# Patient Record
(demographics unavailable — no encounter records)

---

## 2024-10-10 NOTE — HISTORY OF PRESENT ILLNESS
[TextBox_4] : 9/6/24  60F Morbid Obesity Severe DARIAN diagnosed in 2015 Set up with APPA 5-15 on 6/6/2015 Compliant with and benefiting from nocturnal CPAP CPAP now registering that machine has exceeded its motor life.  HTN, DM, RA Never a smoker] TAHJBSO, Nasal Septal repair, Tonsil and adenoid  Lately using her autistic son's Apap as he cannot.

## 2024-10-10 NOTE — DISCUSSION/SUMMARY
[FreeTextEntry1] : IMP  Morbid Obesity Severe DARIAN Compliant with and benefiting from nocturnal CPAP (unable to document today)  Plan   APAP at 5-17 via pillows - ordered  3-month FU

## 2024-10-10 NOTE — CONSULT LETTER
[Dear  ___] : Dear  [unfilled], [Consult Letter:] : I had the pleasure of evaluating your patient, [unfilled]. [Please see my note below.] : Please see my note below. [Consult Closing:] : Thank you very much for allowing me to participate in the care of this patient.  If you have any questions, please do not hesitate to contact me. [Sincerely,] : Sincerely, [DrRosio  ___] : Dr. GILLILAND

## 2025-04-09 NOTE — DISCUSSION/SUMMARY
[FreeTextEntry1] : IMP  Morbid Obesity Severe DARIAN Compliant with and benefiting from nocturnal CPAP (unable to document today)  Plan   APAP at 5-17 via pillows => Jany 12-month FU

## 2025-04-09 NOTE — HISTORY OF PRESENT ILLNESS
[TextBox_4] : 9/6/24  60F Morbid Obesity Severe DARIAN diagnosed in 2015 Set up with APPA 5-15 on 6/6/2015 Compliant with and benefiting from nocturnal CPAP CPAP now registering that machine has exceeded its motor life.  HTN, DM, RA Never a smoker] TAHJBSO, Nasal Septal repair, Tonsil and adenoid  Lately using her autistic son's Apap as he cannot.   4/9/25 Compliant with and benefiting from nocturnal CPAP Unhappy with N30

## 2025-04-09 NOTE — RESULTS/DATA
[TextEntry] : S/N 5/2/2015: AHI=39.9; +12 WP HST on 9/20/24: AHI=44.9 Compliance for 3/1-3/20/25=97%

## 2025-07-10 NOTE — DISCUSSION/SUMMARY
Patient would like communication of their results via:      [employer]    [facility], [place] Due   Topic Date Due   • Influenza Vaccine (1) 09/01/2019       Patient is due for topics as listed above but is not proceeding with Immunization(s) Influenza at this time.                      [EKG obtained to assist in diagnosis and management of assessed problem(s)] : EKG obtained to assist in diagnosis and management of assessed problem(s) [FreeTextEntry1] : Patient is a 62yo F with history morbid obesity s/p gastric sleeve 2019, Raynauds, Aortic atherosclerosis, family history CAD, DARIAN on CPAP, fibromyalgia, RA here for cardiac evaluation.   DARIAN -Continue CPAP and pulm follow up -Needs to get weight down  EDEMA -From lymphedema and CVI, continue leg elevation/prn HCTZ for worsening edema/compression stocking  OBESITY -Struggles with weight loss, had been on ozempic then mounjaro. Only used one month. Will get back on now as will benefit . Will refill at 5mg weekly as had one month lower dose and tolerated  HTN -On losartan/HCTZ with adequate control, will continue   1. Will get back on mounjaro 5mg weekly now 2. Recommend aggressive diet and lifestyle modifications  3. Continue losartan/HCTZ, well enough countrolled  4.  will benefit from statin and get back on Rosuvastatin 10mg qhs , check lipids in 3months and BMP then 5. Will arrange 2 day pharm nuclear stress (unable to walk on treadmill) and echo to shaggy [facility]RE[facility]INO 6. Follow up after testing  7. PMD and rheum follow up

## 2025-07-10 NOTE — ASSESSMENT
[FreeTextEntry1] : ECG: SR, no significant ST-T abnormalities and normal intervals , leftward axis, PRWP

## 2025-07-10 NOTE — HISTORY OF PRESENT ILLNESS
[FreeTextEntry1] : Patient is a 60yo F with history morbid obesity s/p gastric sleeve 2019, Raynauds, Aortic atherosclerosis, family history CAD, DARIAN on CPAP here for cardiac evaluation. Was following with [facility]iu for insurance reasons. Now back for follow up. Gets intermittent chest pain, can be random and not exertional. Can be severe/sharp and unable to move. Will radiate to the left and into shoulder. Seems to help when massages area. This has been more recent in past couple years. Rare palpitation. No PND/orthopnea/syncope. Chronic edema better today, mostly unchanged. Worse in summers and takes extra HCTZ as needed which helps.    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   ROS: GI negative, all others negative